# Patient Record
Sex: FEMALE | Race: ASIAN | URBAN - METROPOLITAN AREA
[De-identification: names, ages, dates, MRNs, and addresses within clinical notes are randomized per-mention and may not be internally consistent; named-entity substitution may affect disease eponyms.]

---

## 2017-03-31 DIAGNOSIS — J45.20 MILD INTERMITTENT ASTHMA WITHOUT COMPLICATION: ICD-10-CM

## 2017-03-31 NOTE — TELEPHONE ENCOUNTER
Left message on answering machine for patient to call back.  Patient needs appointment for refills- its been over a year since last visit.  Rupa HenaoRN  [facility]  [phone]    flovent        Last Written Prescription Date: 12/22/15  Last Fill Quantity: 1, # refills: 1    Last Office Visit with [facility] or [facility] prescribing provider:  12/22/15   Future Office Visit:       Date of Last Asthma Action Plan Letter:   There are no preventive care reminders to display for this patient.   Asthma Control Test:   ACT Total Scores 12/22/2015   ACT TOTAL SCORE (Goal Greater than or Equal to 20) 20       Date of Last Spirometry Test:   No results found for this or any previous visit.

## 2017-03-31 NOTE — LETTER
[address] [address]  Phone: [phone]   April 5, 2017    Christine Felipe  [address] [place] [address]      Dear Christine,    My staff have been attempting to reach you in regards to a recent refill request for:fluticasone (FLOVENT HFA) 110 MCG/ACT inhaler   After reviewing your chart you are due for an office visit.    Please contact my office    Thank you for your time           Sincerely,      Treva Perez PA-C

## 2017-04-03 NOTE — TELEPHONE ENCOUNTER
Left message on answering machine for patient to call back.  Rupa HenaoRN  [facility]  [phone]

## 2017-04-05 RX ORDER — ALBUTEROL SULFATE 90 MCG
HFA AEROSOL WITH ADAPTER (GRAM) INHALATION
Qty: 20.1 G | Refills: 0 | OUTPATIENT
Start: 2017-04-05

## 2017-04-05 NOTE — TELEPHONE ENCOUNTER
Attempt # 3     Called # [phone]    Left a non detailed VM     Letter sent     Would you be willing to fill without an updated Ov?     Shital Ahumada RN, BSN  [facility]

## 2017-04-05 NOTE — TELEPHONE ENCOUNTER
Denied.  Patient follows with Dr. Heller @ [facility] (saw in AUG) - he should authorize any refills

## 2017-04-05 NOTE — TELEPHONE ENCOUNTER
Letter was sent today per RN below.    The patient indicates understanding of these issues and agrees with the plan.  Aretha Peraza RN

## 2018-11-03 ENCOUNTER — OFFICE VISIT (OUTPATIENT)
Dept: URGENT CARE | Facility: URGENT CARE | Age: 28
End: 2018-11-03
Payer: COMMERCIAL

## 2018-11-03 VITALS
OXYGEN SATURATION: 99 % | RESPIRATION RATE: 16 BRPM | DIASTOLIC BLOOD PRESSURE: 72 MMHG | TEMPERATURE: 99.4 F | SYSTOLIC BLOOD PRESSURE: 108 MMHG | BODY MASS INDEX: 20.89 KG/M2 | WEIGHT: 119.8 LBS | HEART RATE: 117 BPM

## 2018-11-03 DIAGNOSIS — R68.89 FLU-LIKE SYMPTOMS: ICD-10-CM

## 2018-11-03 DIAGNOSIS — J45.901 MODERATE ASTHMA WITH EXACERBATION, UNSPECIFIED WHETHER PERSISTENT: ICD-10-CM

## 2018-11-03 DIAGNOSIS — R06.2 WHEEZING: ICD-10-CM

## 2018-11-03 DIAGNOSIS — R05.8 PRODUCTIVE COUGH: ICD-10-CM

## 2018-11-03 DIAGNOSIS — R07.0 THROAT PAIN: Primary | ICD-10-CM

## 2018-11-03 LAB
DEPRECATED S PYO AG THROAT QL EIA: NORMAL
FLUAV+FLUBV AG SPEC QL: NEGATIVE
FLUAV+FLUBV AG SPEC QL: NEGATIVE
SPECIMEN SOURCE: NORMAL
SPECIMEN SOURCE: NORMAL

## 2018-11-03 PROCEDURE — 99214 OFFICE O/P EST MOD 30 MIN: CPT | Mod: 25 | Performed by: PHYSICIAN ASSISTANT

## 2018-11-03 PROCEDURE — 94640 AIRWAY INHALATION TREATMENT: CPT | Performed by: PHYSICIAN ASSISTANT

## 2018-11-03 PROCEDURE — 87081 CULTURE SCREEN ONLY: CPT | Performed by: PHYSICIAN ASSISTANT

## 2018-11-03 PROCEDURE — 87880 STREP A ASSAY W/OPTIC: CPT | Performed by: PHYSICIAN ASSISTANT

## 2018-11-03 PROCEDURE — 87804 INFLUENZA ASSAY W/OPTIC: CPT | Mod: 91 | Performed by: PHYSICIAN ASSISTANT

## 2018-11-03 RX ORDER — ALBUTEROL SULFATE 0.83 MG/ML
2.5 SOLUTION RESPIRATORY (INHALATION) ONCE
Qty: 3 ML | Refills: 0
Start: 2018-11-03 | End: 2018-11-03

## 2018-11-03 RX ORDER — PREDNISONE 20 MG/1
20 TABLET ORAL 2 TIMES DAILY
Qty: 10 TABLET | Refills: 0 | Status: SHIPPED | OUTPATIENT
Start: 2018-11-03

## 2018-11-03 RX ORDER — ALBUTEROL SULFATE 90 UG/1
2 AEROSOL, METERED RESPIRATORY (INHALATION) EVERY 6 HOURS
Qty: 1 INHALER | Refills: 0 | Status: SHIPPED | OUTPATIENT
Start: 2018-11-03

## 2018-11-03 RX ORDER — AZITHROMYCIN 250 MG/1
TABLET, FILM COATED ORAL
Qty: 6 TABLET | Refills: 0 | Status: SHIPPED | OUTPATIENT
Start: 2018-11-03

## 2018-11-03 NOTE — MR AVS SNAPSHOT
After Visit Summary   11/3/2018    Christine Felipe    MRN: [id number]           Patient Information     Date Of Birth          1990        Visit Information        Provider Department      11/3/2018 10:05 AM Rajeev Hernandez PA-C [facility]        Today's Diagnoses     Throat pain    -  1    Flu-like symptoms        Wheezing        Productive cough        Moderate asthma with exacerbation, unspecified whether persistent           Follow-ups after your visit        Who to contact     If you have questions or need follow up information about today's clinic visit or your schedule please contact [place] [facility] directly at [phone].  Normal or non-critical lab and imaging results will be communicated to you by MyChart, letter or phone within 4 business days after the clinic has received the results. If you do not hear from us within 7 days, please contact the clinic through MyChart or phone. If you have a critical or abnormal lab result, we will notify you by phone as soon as possible.  Submit refill requests through [employer] or call your pharmacy and they will forward the refill request to us. Please allow 3 business days for your refill to be completed.          Additional Information About Your Visit        Care EveryWhere ID     This is your Care EveryWhere ID. This could be used by other organizations to access your [facility] medical records  [id number]        Your Vitals Were     Pulse Temperature Respirations Pulse Oximetry BMI (Body Mass Index)       117 99.4  F (37.4  C) (Oral) 16 99% 20.89 kg/m2        Blood Pressure from Last 3 Encounters:   11/03/18 108/72   12/22/15 103/68   10/13/14 92/52    Weight from Last 3 Encounters:   11/03/18 119 lb 12.8 oz (54.3 kg)   12/22/15 126 lb 2 oz (57.2 kg)   10/13/14 127 lb 3.2 oz (57.7 kg)              We Performed the Following     ALBUTEROL UNIT DOSE, 1 MG     Beta strep group A culture      Influenza A/B antigen     INHALATION/NEBULIZER TREATMENT, INITIAL     Strep, Rapid Screen          Today's Medication Changes          These changes are accurate as of 11/3/18 11:10 AM.  If you have any questions, ask your nurse or doctor.               Start taking these medicines.        Dose/Directions    azithromycin 250 MG tablet   Commonly known as:  ZITHROMAX   Used for:  Productive cough        2 tabs po qd day 1, then 1 tab po qd days 2-5   Quantity:  6 tablet   Refills:  0       predniSONE 20 MG tablet   Commonly known as:  DELTASONE   Used for:  Moderate asthma with exacerbation, unspecified whether persistent        Dose:  20 mg   Take 1 tablet (20 mg) by mouth 2 times daily   Quantity:  10 tablet   Refills:  0         These medicines have changed or have updated prescriptions.        Dose/Directions    * albuterol 108 (90 Base) MCG/ACT inhaler   Commonly known as:  PROAIR HFA/PROVENTIL HFA/VENTOLIN HFA   This may have changed:  Another medication with the same name was added. Make sure you understand how and when to take each.   Used for:  Mild intermittent asthma without complication        Dose:  1-2 puff   Inhale 1-2 puffs into the lungs every 4 hours as needed for shortness of breath / dyspnea   Quantity:  3 Inhaler   Refills:  1       * albuterol (2.5 MG/3ML) 0.083% neb solution   This may have changed:  You were already taking a medication with the same name, and this prescription was added. Make sure you understand how and when to take each.   Used for:  Wheezing        Dose:  2.5 mg   Take 1 vial (2.5 mg) by nebulization once for 1 dose   Quantity:  3 mL   Refills:  0       * albuterol 108 (90 Base) MCG/ACT inhaler   Commonly known as:  PROVENTIL HFA   This may have changed:  You were already taking a medication with the same name, and this prescription was added. Make sure you understand how and when to take each.   Used for:  Productive cough        Dose:  2 puff   Inhale 2 puffs into the  lungs every 6 hours   Quantity:  1 Inhaler   Refills:  0       * Notice:  This list has 3 medication(s) that are the same as other medications prescribed for you. Read the directions carefully, and ask your doctor or other care provider to review them with you.         Where to get your medicines      These medications were sent to [facility] [id number] - [facility] [address] AT [facility] [address], [facility] [address]     Phone:  [phone]     albuterol 108 (90 Base) MCG/ACT inhaler    azithromycin 250 MG tablet    predniSONE 20 MG tablet         Some of these will need a paper prescription and others can be bought over the counter.  Ask your nurse if you have questions.     You don't need a prescription for these medications     albuterol (2.5 MG/3ML) 0.083% neb solution                Primary Care Provider Fax #    Physician No Ref-Primary [phone]       No address on file        Equal Access to Services     JULIO ACEVEDO AH: Hadii arjni escalanteo Theodora, waaxda luqadaha, qaybta kaalmada adevanessa, aminah alvarez . So [facility] [phone].    ATENCIÓN: Si habla español, tiene a reyes disposición servicios gratuitos de asistencia lingüística. Llame al [phone].    We comply with applicable federal civil rights laws and [place] laws. We do not discriminate on the basis of race, color, national origin, age, disability, sex, sexual orientation, or gender identity.            Thank you!     Thank you for choosing [facility]  for your care. Our goal is always to provide you with excellent care. Hearing back from our patients is one way we can continue to improve our services. Please take a few minutes to complete the written survey that you may receive in the mail after your visit with us. Thank you!             Your Updated Medication List - Protect others around you: Learn how to safely use, store and throw away  your medicines at www.disposemymeds.org.          This list is accurate as of 11/3/18 11:10 AM.  Always use your most recent med list.                   Brand Name Dispense Instructions for use Diagnosis    * albuterol 108 (90 Base) MCG/ACT inhaler    PROAIR HFA/PROVENTIL HFA/VENTOLIN HFA    3 Inhaler    Inhale 1-2 puffs into the lungs every 4 hours as needed for shortness of breath / dyspnea    Mild intermittent asthma without complication       * albuterol (2.5 MG/3ML) 0.083% neb solution     3 mL    Take 1 vial (2.5 mg) by nebulization once for 1 dose    Wheezing       * albuterol 108 (90 Base) MCG/ACT inhaler    PROVENTIL HFA    1 Inhaler    Inhale 2 puffs into the lungs every 6 hours    Productive cough       azithromycin 250 MG tablet    ZITHROMAX    6 tablet    2 tabs po qd day 1, then 1 tab po qd days 2-5    Productive cough       cetirizine 10 MG tablet    zyrTEC    90 tablet    Take 1 tablet (10 mg) by mouth every evening    Allergic rhinitis, unspecified allergic rhinitis type       fluticasone 110 MCG/ACT Inhaler    FLOVENT HFA    1 Inhaler    2 puffs qd-bid    Mild intermittent asthma without complication       mometasone 50 MCG/ACT spray    NASONEX    1 Box    Spray 2 sprays into both nostrils daily    Allergic rhinitis, unspecified allergic rhinitis type       montelukast 10 MG tablet    SINGULAIR    90 tablet    Take 1 tablet (10 mg) by mouth At Bedtime    Mild intermittent asthma without complication       predniSONE 20 MG tablet    DELTASONE    10 tablet    Take 1 tablet (20 mg) by mouth 2 times daily    Moderate asthma with exacerbation, unspecified whether persistent       * Notice:  This list has 3 medication(s) that are the same as other medications prescribed for you. Read the directions carefully, and ask your doctor or other care provider to review them with you.

## 2018-11-03 NOTE — PROGRESS NOTES
SUBJECTIVE:   Christine Felipe is a 28 year old female presenting with a chief complaint of chest congestion, wheezing, SOB at times.  Onset of symptoms was 3 day(s) ago.  Course of illness is worsening.    Severity moderate  Current and Associated symptoms: coughing, chest congestion  Treatment measures tried include OTC Cough med.  Predisposing factors include asthma and recent illness.    Past Medical History:   Diagnosis Date     Asthma      Asthma, mild intermittent     flared with URI        Allergies   Allergen Reactions     No Known Drug Allergies      Seasonal Allergies          Social History   Substance Use Topics     Smoking status: Never Smoker     Smokeless tobacco: Not on file     Alcohol use 0.0 oz/week     0 Standard drinks or equivalent per week      Comment: rare       ROS:  CONSTITUTIONAL:POSITIVE  for fever  INTEGUMENTARY/SKIN: NEGATIVE for worrisome rashes, moles or lesions  ENT/MOUTH: POSITIVE for throat pain  RESP:POSITIVE for cough-productive, Hx asthma and wheezing  CV: NEGATIVE for chest pain, palpitations or peripheral edema  GI: NEGATIVE for nausea, abdominal pain, heartburn, or change in bowel habits  MUSCULOSKELETAL: NEGATIVE for significant arthralgias or myalgia  NEURO: NEGATIVE for weakness, dizziness or paresthesias    OBJECTIVE  :/72  Pulse 117  Temp 99.4  F (37.4  C) (Oral)  Resp 16  Wt 119 lb 12.8 oz (54.3 kg)  SpO2 99%  BMI 20.89 kg/m2  GENERAL APPEARANCE: healthy, alert and no distress  EYES: EOMI,  PERRL, conjunctiva clear  HENT: ear canals and TM's normal.  Nose and mouth without ulcers, erythema or lesions  NECK: supple, nontender, no lymphadenopathy  RESP: expiratory wheezes generalized  CV: regular rates and rhythm, normal S1 S2, no murmur noted  NEURO: Normal strength and tone, sensory exam grossly normal,  normal speech and mentation  SKIN: no suspicious lesions or rashes    Albuterol neb given in clinic    ASSESSMENT/PLAN      ICD-10-CM    1. Throat pain R07.0  Strep, Rapid Screen     Beta strep group A culture     ALBUTEROL UNIT DOSE, 1 MG   2. Flu-like symptoms R68.89 Influenza A/B antigen     ALBUTEROL UNIT DOSE, 1 MG   3. Wheezing R06.2 INHALATION/NEBULIZER TREATMENT, INITIAL     albuterol (2.5 MG/3ML) 0.083% neb solution     ALBUTEROL UNIT DOSE, 1 MG   4. Productive cough R05 azithromycin (ZITHROMAX) 250 MG tablet     albuterol (PROVENTIL HFA) 108 (90 Base) MCG/ACT inhaler   5. Moderate asthma with exacerbation, unspecified whether persistent J45.901 predniSONE (DELTASONE) 20 MG tablet       Orders Placed This Encounter     INHALATION/NEBULIZER TREATMENT, INITIAL     ALBUTEROL UNIT DOSE, 1 MG     albuterol (2.5 MG/3ML) 0.083% neb solution     predniSONE (DELTASONE) 20 MG tablet     azithromycin (ZITHROMAX) 250 MG tablet     albuterol (PROVENTIL HFA) 108 (90 Base) MCG/ACT inhaler       Follow up as needed  See orders in Epic

## 2018-11-04 LAB
BACTERIA SPEC CULT: NORMAL
SPECIMEN SOURCE: NORMAL